# Patient Record
Sex: FEMALE | Race: WHITE | Employment: UNEMPLOYED | URBAN - METROPOLITAN AREA
[De-identification: names, ages, dates, MRNs, and addresses within clinical notes are randomized per-mention and may not be internally consistent; named-entity substitution may affect disease eponyms.]

---

## 2017-03-29 ENCOUNTER — PRE VISIT (OUTPATIENT)
Dept: CARDIOLOGY | Facility: CLINIC | Age: 62
End: 2017-03-29

## 2017-03-29 NOTE — TELEPHONE ENCOUNTER
PMD faxed for records update. Left message for patient asking that she update her VALENTINE with [facility] so care everywhere can be utilized at her visit.

## 2017-04-12 ENCOUNTER — OFFICE VISIT (OUTPATIENT)
Dept: CARDIOLOGY | Facility: CLINIC | Age: 62
End: 2017-04-12
Attending: INTERNAL MEDICINE
Payer: MEDICARE

## 2017-04-12 VITALS
SYSTOLIC BLOOD PRESSURE: 124 MMHG | WEIGHT: 113.2 LBS | BODY MASS INDEX: 26.2 KG/M2 | DIASTOLIC BLOOD PRESSURE: 62 MMHG | HEART RATE: 92 BPM | HEIGHT: 55 IN

## 2017-04-12 DIAGNOSIS — I25.10 CORONARY ARTERY DISEASE INVOLVING NATIVE CORONARY ARTERY OF NATIVE HEART WITHOUT ANGINA PECTORIS: ICD-10-CM

## 2017-04-12 PROCEDURE — 99213 OFFICE O/P EST LOW 20 MIN: CPT | Performed by: INTERNAL MEDICINE

## 2017-04-12 RX ORDER — OMEPRAZOLE 40 MG/1
CAPSULE, DELAYED RELEASE ORAL 2 TIMES DAILY
COMMUNITY

## 2017-04-12 NOTE — LETTER
4/12/2017    Jordon Damico MD  [address] [address]    RE: Kenzie Olson       Dear Colleague,    I had the pleasure of seeing Kenzie Olson in the [facility].    Ms. Kenzie Olson was seen in followup at [facility] in [place].  At 61 years of age, she has end-stage renal disease and is followed for her history of coronary artery disease.      She denies any chest, neck, arm or back discomfort.  She further denies any palpitations, having undergone an SVT ablation in the past.      In 2013, she experienced a non-ST elevation myocardial infarct in the hospital after undergoing pelvic surgery.  She underwent coronary angiography and intervention on the distal right coronary artery with stent placement.  At the time of that angiogram, the left main coronary artery was free of disease.  There was a mid LAD lesion of 60%-70% and the circumflex coronary artery disease, had no obstructive disease.      She had previously undergone renal transplantation, but developed progressive renal failure and has reinitiated on hemodialysis.  She is on dialysis 3 times weekly.  She expresses a sense of fatigue but no shortness of breath or anginal symptoms as noted above.        Her last stress nuclear study was performed during 06/2015 as a preoperative evaluation for sinus surgery.  There was no evidence of ischemia and there was a normal ejection fraction estimated.      She notes that she is following with renal transplant at [facility].  She has had difficulties with anemia and now her white blood cell count is low.  It has been recommended that she see Hematology, but an appointment has not been arranged.      She is on aspirin and beta blockade, but she is not on statin therapy.  She is not on an ARB agent or an ACE inhibitor and I suspect that predates her initiation on hemodialysis.        PHYSICAL EXAMINATION:   GENERAL:  This is a woman in no apparent  distress.  She appears fit and healthy.   VITAL SIGNS:  Blood pressure was 124/62 mmHg, heart rate 92 beats per minute and regular, respiratory rate 14-18 per minute.   CHEST:  Clear to auscultation.   CARDIAC:  On cardiac auscultation, there was an S1 and S2 without extra sounds or murmur.  The rhythm was regular.     Outpatient Encounter Prescriptions as of 4/12/2017   Medication Sig Dispense Refill     omeprazole (PRILOSEC) 40 MG capsule Take by mouth 2 times daily       AMLODIPINE BESYLATE PO Take 5 mg by mouth every evening       SODIUM BICARBONATE PO Take 650 mg by mouth 3 times daily        calcium carbonate (TUMS) 500 MG chewable tablet Take 2 chew tab by mouth 2 times daily       metoprolol (TOPROL-XL) 50 MG 24 hr tablet Take 1 tablet (50 mg) by mouth daily 90 tablet 3     folic acid (FOLVITE) 1 MG tablet Take 1 tablet (1 mg) by mouth daily 30 tablet 2     Cholecalciferol (VITAMIN D3 PO) Take 1,000 Units by mouth 2 times daily        aspirin 81 MG chewable tablet Take 1 tablet (81 mg) by mouth daily 30 tablet      cycloSPORINE modified (NEORAL) 25 MG capsule Take 2 capsules (50 mg) by mouth 2 times daily       sulfamethoxazole-trimethoprim (BACTRIM,SEPTRA) 400-80 MG per tablet Take 1 tablet by mouth daily        mycophenolate (CELLCEPT-GENERIC EQUIVALENT) 500 MG tablet Take 500 mg by mouth 2 times daily       [DISCONTINUED] pantoprazole (PROTONIX) 40 MG EC tablet Take 1 tablet (40 mg) by mouth 2 times daily Take 30-60 minutes before a meal. 30 tablet 1     [DISCONTINUED] ferrous sulfate (IRON) 325 (65 FE) MG tablet Take 1 tablet (325 mg) by mouth 2 times daily (with meals) . Best if taken with orange juice 60 tablet 2     Facility-Administered Encounter Medications as of 4/12/2017   Medication Dose Route Frequency Provider Last Rate Last Dose     sodium chloride (PF) 0.9% PF flush 10 mL  10 mL Intravenous Q10 Min PRN Rob Isabel MD   10 mL at 07/17/14 1111     sodium chloride bacteriostatic 0.9  % flush 0-1 mL  0-1 mL Intradermal Once PRN Rob Isabel MD         sodium chloride (PF) 0.9% PF flush 5-10 mL  5-10 mL Intravenous q1 min prn Rob Isabel MD          ASSESSMENT:  Ms. Olson is overall doing well.  She remains asymptomatic and on risk factor intervention.  Her risk factor intervention could probably be optimized with a statin drug added to her medical regimen,  particularly given the fact that she has renal disease and is on dialysis.  Eventually, I would anticipate that an ARB agent or ACE inhibitor could be reinitiated.      I have arranged for followup with Ms. Olson on an annual basis unless she has earlier symptoms, at which time I would want to see her back sooner.      With regard to hematology, we suggested [facility], which I believe was the recommendation.  We gave her the number and recommended making an appointment for further evaluation.     Again, thank you for allowing me to participate in the care of your patient.      Sincerely,    Yenny Chapa MD     [facility]

## 2017-04-12 NOTE — MR AVS SNAPSHOT
"              After Visit Summary   4/12/2017    Kenzie Olson    MRN: [id number]           Patient Information     Date Of Birth          1955        Visit Information        Provider Department      4/12/2017 1:15 PM Yenny Chapa MD [facility]        Today's Diagnoses     Coronary artery disease involving native coronary artery of native heart without angina pectoris           Follow-ups after your visit        Additional Services     Follow-Up with Cardiologist                 Future tests that were ordered for you today     Open Future Orders        Priority Expected Expires Ordered    Follow-Up with Cardiologist Routine 4/12/2018 8/25/2018 4/12/2017            Who to contact     If you have questions or need follow up information about today's clinic visit or your schedule please contact [facility] directly at [phone].  Normal or non-critical lab and imaging results will be communicated to you by [employer], letter or phone within 4 business days after the clinic has received the results. If you do not hear from us within 7 days, please contact the clinic through [employer] or phone. If you have a critical or abnormal lab result, we will notify you by phone as soon as possible.  Submit refill requests through [employer] or call your pharmacy and they will forward the refill request to us. Please allow 3 business days for your refill to be completed.          Additional Information About Your Visit        MyChart Information     [employer] lets you send messages to your doctor, view your test results, renew your prescriptions, schedule appointments and more. To sign up, go to [email or web address] . Click on \"Log in\" on the left side of the screen, which will take you to the Welcome page. Then click on \"Sign up Now\" on the right side of the page.     You will be asked to enter the access code listed below, as well as some " "personal information. Please follow the directions to create your username and password.     Your access code is: [email or web address]  Expires: 2017  1:58 PM     Your access code will  in 90 days. If you need help or a new code, please call your [facility] clinic or [phone].        Care EveryWhere ID     This is your Care EveryWhere ID. This could be used by other organizations to access your [facility] medical records  [id number]        Your Vitals Were     Pulse Height BMI (Body Mass Index)             92 1.397 m (4' 7\") 26.31 kg/m2          Blood Pressure from Last 3 Encounters:   17 124/62   16 149/68   10/13/16 122/68    Weight from Last 3 Encounters:   17 51.3 kg (113 lb 3.2 oz)   16 51.7 kg (114 lb)   10/13/16 52.1 kg (114 lb 14.4 oz)              We Performed the Following     EKG 12-lead complete w/read - Clinics (performed today)     Follow-Up with Cardiologist          Today's Medication Changes          These changes are accurate as of: 17  1:59 PM.  If you have any questions, ask your nurse or doctor.               Stop taking these medicines if you haven't already. Please contact your care team if you have questions.     ferrous sulfate 325 (65 FE) MG tablet   Commonly known as:  IRON   Stopped by:  Yenny Chapa MD           pantoprazole 40 MG EC tablet   Commonly known as:  PROTONIX   Stopped by:  Yenny Chapa MD                    Primary Care Provider Office Phone # Fax #    Jordon Damico [phone]       [address] [address]        Thank you!     Thank you for choosing [facility] [place]  for your care. Our goal is always to provide you with excellent care. Hearing back from our patients is one way we can continue to improve our services. Please take a few minutes to complete the written survey that you may receive in the mail after your visit with us. Thank " you!             Your Updated Medication List - Protect others around you: Learn how to safely use, store and throw away your medicines at www.disposemymeds.org.          This list is accurate as of: 4/12/17  1:59 PM.  Always use your most recent med list.                   Brand Name Dispense Instructions for use    AMLODIPINE BESYLATE PO      Take 5 mg by mouth every evening       aspirin 81 MG chewable tablet     30 tablet    Take 1 tablet (81 mg) by mouth daily       calcium carbonate 500 MG chewable tablet    TUMS     Take 2 chew tab by mouth 2 times daily       cycloSPORINE modified capsule      Take 2 capsules (50 mg) by mouth 2 times daily       folic acid 1 MG tablet    FOLVITE    30 tablet    Take 1 tablet (1 mg) by mouth daily       metoprolol 50 MG 24 hr tablet    TOPROL-XL    90 tablet    Take 1 tablet (50 mg) by mouth daily       mycophenolate 500 MG tablet    CELLCEPT - GENERIC EQUIVALENT     Take 500 mg by mouth 2 times daily       omeprazole 40 MG capsule    priLOSEC     Take by mouth 2 times daily       SODIUM BICARBONATE PO      Take 650 mg by mouth 3 times daily       sulfamethoxazole-trimethoprim 400-80 MG per tablet    BACTRIM/SEPTRA     Take 1 tablet by mouth daily       VITAMIN D3 PO      Take 1,000 Units by mouth 2 times daily

## 2017-04-13 NOTE — PROGRESS NOTES
HPI and Plan:   See dictation    Orders Placed This Encounter   Procedures     Follow-Up with Cardiologist     EKG 12-lead complete w/read - Clinics (performed today)       Orders Placed This Encounter   Medications     omeprazole (PRILOSEC) 40 MG capsule     Sig: Take by mouth 2 times daily       Medications Discontinued During This Encounter   Medication Reason     ferrous sulfate (IRON) 325 (65 FE) MG tablet      pantoprazole (PROTONIX) 40 MG EC tablet          Encounter Diagnosis   Name Primary?     Coronary artery disease involving native coronary artery of native heart without angina pectoris        CURRENT MEDICATIONS:  Current Outpatient Prescriptions   Medication Sig Dispense Refill     omeprazole (PRILOSEC) 40 MG capsule Take by mouth 2 times daily       AMLODIPINE BESYLATE PO Take 5 mg by mouth every evening       SODIUM BICARBONATE PO Take 650 mg by mouth 3 times daily        calcium carbonate (TUMS) 500 MG chewable tablet Take 2 chew tab by mouth 2 times daily       metoprolol (TOPROL-XL) 50 MG 24 hr tablet Take 1 tablet (50 mg) by mouth daily 90 tablet 3     folic acid (FOLVITE) 1 MG tablet Take 1 tablet (1 mg) by mouth daily 30 tablet 2     Cholecalciferol (VITAMIN D3 PO) Take 1,000 Units by mouth 2 times daily        aspirin 81 MG chewable tablet Take 1 tablet (81 mg) by mouth daily 30 tablet      cycloSPORINE modified (NEORAL) 25 MG capsule Take 2 capsules (50 mg) by mouth 2 times daily       sulfamethoxazole-trimethoprim (BACTRIM,SEPTRA) 400-80 MG per tablet Take 1 tablet by mouth daily        mycophenolate (CELLCEPT-GENERIC EQUIVALENT) 500 MG tablet Take 500 mg by mouth 2 times daily         ALLERGIES     Allergies   Allergen Reactions     Morphine Sulfate Other (See Comments)     hallucinations       PAST MEDICAL HISTORY:  Past Medical History:   Diagnosis Date     Arthritis      Breast cancer (H)     lumpectomy and radiation right side  2006     Chronic kidney disease     end stage     Coronary  artery disease     cath 9/20/13: KAPIL to RCA     Coughing      Difficulty in walking(719.7)      Gastro-oesophageal reflux disease      Hypertension      Myocardial infarction (H) 2013    NSTEMI     Paroxysmal supraventricular tachycardia (H)     7/2013 Ablation of AV node for reentrant tach     PONV (postoperative nausea and vomiting)      Renal disease     hx of kidney transplant 1996, Dr. Stevenson     Stented coronary artery      Vitamin D deficient rickets      Walking troubles        PAST SURGICAL HISTORY:  Past Surgical History:   Procedure Laterality Date     bow leg correction       COLONOSCOPY N/A 12/1/2016    Procedure: COLONOSCOPY;  Surgeon: Jordon Alexis MD;  Location:  GI     ESOPHAGOSCOPY, GASTROSCOPY, DUODENOSCOPY (EGD), COMBINED N/A 12/1/2016    Procedure: COMBINED ESOPHAGOSCOPY, GASTROSCOPY, DUODENOSCOPY (EGD), BIOPSY SINGLE OR MULTIPLE;  Surgeon: Jordon Alexis MD;  Location:  GI     H ABLATION AV NODE  7-11-13    SVT ablation      HEART CATH, ANGIOPLASTY  9-20-13    severe right coronary artery stenosis of 95% in the distal vessel.  KAPIL      LAPAROTOMY EXPLORATORY  9/6/2013    Procedure: LAPAROTOMY EXPLORATORY;  Bilateral salpingo-oophorectomy, anastomsis of renal vein by Dr. Abbasi;  Surgeon: Tommy Foote MD;  Location:  OR     LUMPECTOMY BREAST       OPTICAL TRACKING SYSTEM ENDOSCOPIC SINUS SURGERY N/A 7/22/2015    Procedure: OPTICAL TRACKING SYSTEM ENDOSCOPIC SINUS SURGERY;  Surgeon: María Diamond MD;  Location: [facility] OR     ORTHOPEDIC SURGERY      left shoulder surgery,  torn meniscus left knee     PARATHYROIDECTOMY       PARATHYROIDECTOMY       SALPINGO-OOPHORECTOMY BILATERAL  9/6/2013    Procedure: SALPINGO-OOPHORECTOMY BILATERAL;;  Surgeon: Tommy Foote MD;  Location:  OR     TRANSPLANT KIDNEY AUTOLOGOUS         FAMILY HISTORY:  Family History   Problem Relation Age of Onset     CEREBROVASCULAR DISEASE Mother 69     Hypertension Mother      CEREBROVASCULAR DISEASE  "Father 82     Hypertension Father      HEART DISEASE Brother        SOCIAL HISTORY:  Social History     Social History     Marital status: Single     Spouse name: N/A     Number of children: N/A     Years of education: N/A     Social History Main Topics     Smoking status: Never Smoker     Smokeless tobacco: Never Used     Alcohol use No     Drug use: No     Sexual activity: No     Other Topics Concern     Parent/Sibling W/ Cabg, Mi Or Angioplasty Before 65f 55m? No     Caffeine Concern Yes     3 cups a week     Special Diet Yes     Exercise No     Social History Narrative    Enjoys reading.       Review of Systems:  Skin:  Negative       Eyes:  Positive for glasses    ENT:  Positive for hearing loss  (? esophegael stricture, food gets caught)  Respiratory:  Positive for cough;dyspnea on exertion during dialysis   Cardiovascular:    Positive for;dizziness;lightheadedness;fatigue;palpitations    Gastroenterology: Positive for poor appetite for days after dialysis appetite not good   Genitourinary:  Positive for nocturia q 2 hours   Musculoskeletal:  Positive for joint pain    Neurologic:  Positive for headaches    Psychiatric:  Positive for sleep disturbances    Heme/Lymph/Imm:  Positive for allergies    Endocrine:  Positive for thyroid disorder      Physical Exam:  Vitals: /62  Pulse 92  Ht 1.397 m (4' 7\")  Wt 51.3 kg (113 lb 3.2 oz)  BMI 26.31 kg/m2    Constitutional:  cooperative, alert and oriented, well developed, well nourished, in no acute distress        Skin:  warm and dry to the touch        Head:  normocephalic        Eyes:  sclera white        ENT:           Neck:           Chest:  normal breath sounds, clear to auscultation, normal A-P diameter, normal symmetry, normal respiratory excursion, no use of accessory muscles          Cardiac: regular rhythm, normal S1/S2, no S3 or S4, apical impulse not displaced, no murmurs, gallops or rubs                  Abdomen:           Vascular:               "                            Extremities and Back:  no edema;no deformities, clubbing, cyanosis, erythema observed              Neurological:  no gross motor deficits;affect appropriate, oriented to time, person and place              CC  Yenny Chapa MD   [facility]  [address]  [place] [address]

## 2017-04-19 ENCOUNTER — TRANSFERRED RECORDS (OUTPATIENT)
Dept: HEALTH INFORMATION MANAGEMENT | Facility: CLINIC | Age: 62
End: 2017-04-19

## 2017-04-26 ENCOUNTER — HOSPITAL ENCOUNTER (OUTPATIENT)
Facility: CLINIC | Age: 62
Discharge: HOME | End: 2017-04-26
Attending: PATHOLOGY | Admitting: INTERNAL MEDICINE
Payer: MEDICARE

## 2017-04-26 VITALS
RESPIRATION RATE: 16 BRPM | BODY MASS INDEX: 26.38 KG/M2 | OXYGEN SATURATION: 98 % | HEIGHT: 55 IN | DIASTOLIC BLOOD PRESSURE: 74 MMHG | WEIGHT: 114 LBS | TEMPERATURE: 97.2 F | SYSTOLIC BLOOD PRESSURE: 154 MMHG

## 2017-04-26 LAB
ABO + RH BLD: NORMAL
ABO + RH BLD: NORMAL
BASOPHILS # BLD AUTO: 0 10E9/L (ref 0–0.2)
BASOPHILS NFR BLD AUTO: 0.3 %
BLD GP AB SCN SERPL QL: NORMAL
BLD PROD TYP BPU: NORMAL
BLD UNIT ID BPU: 0
BLD UNIT ID BPU: 0
BLOOD BANK CMNT PATIENT-IMP: NORMAL
BLOOD PRODUCT CODE: NORMAL
BLOOD PRODUCT CODE: NORMAL
BPU ID: NORMAL
BPU ID: NORMAL
DIFFERENTIAL METHOD BLD: ABNORMAL
EOSINOPHIL # BLD AUTO: 0.1 10E9/L (ref 0–0.7)
EOSINOPHIL NFR BLD AUTO: 3.3 %
ERYTHROCYTE [DISTWIDTH] IN BLOOD BY AUTOMATED COUNT: 14.8 % (ref 10–15)
HCT VFR BLD AUTO: 20.7 % (ref 35–47)
HGB BLD-MCNC: 6.4 G/DL (ref 11.7–15.7)
HGB BLD-MCNC: 7 G/DL (ref 11.7–15.7)
IMM GRANULOCYTES # BLD: 0 10E9/L (ref 0–0.4)
IMM GRANULOCYTES NFR BLD: 0 %
LYMPHOCYTES # BLD AUTO: 0.3 10E9/L (ref 0.8–5.3)
LYMPHOCYTES NFR BLD AUTO: 8.7 %
MCH RBC QN AUTO: 30.5 PG (ref 26.5–33)
MCHC RBC AUTO-ENTMCNC: 30.9 G/DL (ref 31.5–36.5)
MCV RBC AUTO: 99 FL (ref 78–100)
MONOCYTES # BLD AUTO: 0.3 10E9/L (ref 0–1.3)
MONOCYTES NFR BLD AUTO: 8.7 %
NEUTROPHILS # BLD AUTO: 2.6 10E9/L (ref 1.6–8.3)
NEUTROPHILS NFR BLD AUTO: 79 %
NRBC # BLD AUTO: 0 10*3/UL
NRBC BLD AUTO-RTO: 0 /100
NUM BPU REQUESTED: 2
PLATELET # BLD AUTO: 257 10E9/L (ref 150–450)
RBC # BLD AUTO: 2.1 10E12/L (ref 3.8–5.2)
RETICS # AUTO: 49.3 10E9/L (ref 25–95)
RETICS/RBC NFR AUTO: 2.4 % (ref 0.5–2)
SPECIMEN EXP DATE BLD: NORMAL
TRANSFUSION STATUS PATIENT QL: NORMAL
WBC # BLD AUTO: 3.3 10E9/L (ref 4–11)

## 2017-04-26 PROCEDURE — 36430 TRANSFUSION BLD/BLD COMPNT: CPT

## 2017-04-26 PROCEDURE — 88305 TISSUE EXAM BY PATHOLOGIST: CPT | Performed by: INTERNAL MEDICINE

## 2017-04-26 PROCEDURE — 88182 CELL MARKER STUDY: CPT | Performed by: INTERNAL MEDICINE

## 2017-04-26 PROCEDURE — P9016 RBC LEUKOCYTES REDUCED: HCPCS | Performed by: INTERNAL MEDICINE

## 2017-04-26 PROCEDURE — 40000948 ZZHCL STATISTIC BONE MARROW ASP TC 85097: Performed by: INTERNAL MEDICINE

## 2017-04-26 PROCEDURE — 88237 TISSUE CULTURE BONE MARROW: CPT | Performed by: PATHOLOGY

## 2017-04-26 PROCEDURE — 88305 TISSUE EXAM BY PATHOLOGIST: CPT | Mod: 26 | Performed by: INTERNAL MEDICINE

## 2017-04-26 PROCEDURE — 85097 BONE MARROW INTERPRETATION: CPT | Performed by: INTERNAL MEDICINE

## 2017-04-26 PROCEDURE — 00000159 ZZHCL STATISTIC H-SEND OUTS PREP: Performed by: INTERNAL MEDICINE

## 2017-04-26 PROCEDURE — 36415 COLL VENOUS BLD VENIPUNCTURE: CPT | Performed by: INTERNAL MEDICINE

## 2017-04-26 PROCEDURE — 40000424 ZZHCL STATISTIC BONE MARROW CORE PERF TC 38221: Performed by: INTERNAL MEDICINE

## 2017-04-26 PROCEDURE — 36415 COLL VENOUS BLD VENIPUNCTURE: CPT | Performed by: PATHOLOGY

## 2017-04-26 PROCEDURE — 88311 DECALCIFY TISSUE: CPT | Mod: 26 | Performed by: INTERNAL MEDICINE

## 2017-04-26 PROCEDURE — 88313 SPECIAL STAINS GROUP 2: CPT | Performed by: INTERNAL MEDICINE

## 2017-04-26 PROCEDURE — 88184 FLOWCYTOMETRY/ TC 1 MARKER: CPT | Performed by: PATHOLOGY

## 2017-04-26 PROCEDURE — 88185 FLOWCYTOMETRY/TC ADD-ON: CPT | Performed by: PATHOLOGY

## 2017-04-26 PROCEDURE — 85025 COMPLETE CBC W/AUTO DIFF WBC: CPT | Performed by: PATHOLOGY

## 2017-04-26 PROCEDURE — 00000058 ZZHCL STATISTIC BONE MARROW ASP PERF TC 38220: Performed by: INTERNAL MEDICINE

## 2017-04-26 PROCEDURE — 25000125 ZZHC RX 250: Performed by: PATHOLOGY

## 2017-04-26 PROCEDURE — 88264 CHROMOSOME ANALYSIS 20-25: CPT | Performed by: PATHOLOGY

## 2017-04-26 PROCEDURE — 40001005 ZZHCL STATISTIC FLOW >15 ABY TC 88189: Performed by: PATHOLOGY

## 2017-04-26 PROCEDURE — 40000795 ZZHCL STATISTIC DNA PROCESS AND HOLD: Performed by: INTERNAL MEDICINE

## 2017-04-26 PROCEDURE — 88313 SPECIAL STAINS GROUP 2: CPT | Mod: 26 | Performed by: INTERNAL MEDICINE

## 2017-04-26 PROCEDURE — 38221 DX BONE MARROW BIOPSIES: CPT | Performed by: PATHOLOGY

## 2017-04-26 PROCEDURE — 86850 RBC ANTIBODY SCREEN: CPT | Performed by: INTERNAL MEDICINE

## 2017-04-26 PROCEDURE — 88311 DECALCIFY TISSUE: CPT | Performed by: INTERNAL MEDICINE

## 2017-04-26 PROCEDURE — 85045 AUTOMATED RETICULOCYTE COUNT: CPT | Performed by: PATHOLOGY

## 2017-04-26 PROCEDURE — 85018 HEMOGLOBIN: CPT | Performed by: INTERNAL MEDICINE

## 2017-04-26 PROCEDURE — 86901 BLOOD TYPING SEROLOGIC RH(D): CPT | Performed by: INTERNAL MEDICINE

## 2017-04-26 PROCEDURE — G0364 BONE MARROW ASPIRATE &BIOPSY: HCPCS | Performed by: INTERNAL MEDICINE

## 2017-04-26 PROCEDURE — 86900 BLOOD TYPING SEROLOGIC ABO: CPT | Performed by: INTERNAL MEDICINE

## 2017-04-26 PROCEDURE — 38221 DX BONE MARROW BIOPSIES: CPT | Performed by: INTERNAL MEDICINE

## 2017-04-26 PROCEDURE — 85060 BLOOD SMEAR INTERPRETATION: CPT | Performed by: INTERNAL MEDICINE

## 2017-04-26 PROCEDURE — 40000847 ZZHCL STATISTIC MORPHOLOGY W/INTERP HISTOLOGY TC 85060: Performed by: INTERNAL MEDICINE

## 2017-04-26 PROCEDURE — 86923 COMPATIBILITY TEST ELECTRIC: CPT | Performed by: INTERNAL MEDICINE

## 2017-04-26 PROCEDURE — 88280 CHROMOSOME KARYOTYPE STUDY: CPT | Performed by: PATHOLOGY

## 2017-04-26 RX ORDER — FLUMAZENIL 0.1 MG/ML
0.2 INJECTION, SOLUTION INTRAVENOUS
Status: DISCONTINUED | OUTPATIENT
Start: 2017-04-26 | End: 2017-04-26 | Stop reason: HOSPADM

## 2017-04-26 RX ORDER — FLUMAZENIL 0.1 MG/ML
0.2 INJECTION, SOLUTION INTRAVENOUS
Status: DISCONTINUED | OUTPATIENT
Start: 2017-04-26 | End: 2017-04-26

## 2017-04-26 RX ORDER — NALOXONE HYDROCHLORIDE 0.4 MG/ML
.1-.4 INJECTION, SOLUTION INTRAMUSCULAR; INTRAVENOUS; SUBCUTANEOUS
Status: DISCONTINUED | OUTPATIENT
Start: 2017-04-26 | End: 2017-04-26

## 2017-04-26 RX ORDER — FENTANYL CITRATE 50 UG/ML
50 INJECTION, SOLUTION INTRAMUSCULAR; INTRAVENOUS
Status: DISCONTINUED | OUTPATIENT
Start: 2017-04-26 | End: 2017-04-26

## 2017-04-26 RX ORDER — LIDOCAINE HYDROCHLORIDE AND EPINEPHRINE 10; 10 MG/ML; UG/ML
INJECTION, SOLUTION INFILTRATION; PERINEURAL PRN
Status: DISCONTINUED | OUTPATIENT
Start: 2017-04-26 | End: 2017-04-26 | Stop reason: HOSPADM

## 2017-04-26 RX ORDER — FENTANYL CITRATE 50 UG/ML
25-50 INJECTION, SOLUTION INTRAMUSCULAR; INTRAVENOUS EVERY 5 MIN PRN
Status: DISCONTINUED | OUTPATIENT
Start: 2017-04-26 | End: 2017-04-26

## 2017-04-26 RX ORDER — FENTANYL CITRATE 50 UG/ML
25-50 INJECTION, SOLUTION INTRAMUSCULAR; INTRAVENOUS EVERY 5 MIN PRN
Status: DISCONTINUED | OUTPATIENT
Start: 2017-04-26 | End: 2017-04-26 | Stop reason: HOSPADM

## 2017-04-26 RX ORDER — ONDANSETRON 2 MG/ML
4 INJECTION INTRAMUSCULAR; INTRAVENOUS
Status: DISCONTINUED | OUTPATIENT
Start: 2017-04-26 | End: 2017-04-26 | Stop reason: HOSPADM

## 2017-04-26 RX ORDER — NALOXONE HYDROCHLORIDE 0.4 MG/ML
.1-.4 INJECTION, SOLUTION INTRAMUSCULAR; INTRAVENOUS; SUBCUTANEOUS
Status: DISCONTINUED | OUTPATIENT
Start: 2017-04-26 | End: 2017-04-26 | Stop reason: HOSPADM

## 2017-04-26 RX ORDER — LIDOCAINE HYDROCHLORIDE 10 MG/ML
8-10 INJECTION, SOLUTION INFILTRATION; PERINEURAL
Status: DISCONTINUED | OUTPATIENT
Start: 2017-04-26 | End: 2017-04-26 | Stop reason: HOSPADM

## 2017-04-26 RX ORDER — FENTANYL CITRATE 50 UG/ML
50 INJECTION, SOLUTION INTRAMUSCULAR; INTRAVENOUS
Status: DISCONTINUED | OUTPATIENT
Start: 2017-04-26 | End: 2017-04-26 | Stop reason: HOSPADM

## 2017-04-26 RX ORDER — ONDANSETRON 2 MG/ML
4 INJECTION INTRAMUSCULAR; INTRAVENOUS
Status: DISCONTINUED | OUTPATIENT
Start: 2017-04-26 | End: 2017-04-26

## 2017-04-26 NOTE — PROCEDURES
The patient was positively identified and informed consent was obtained (see the completed Affirmation of Consent for Bone Marrow Aspiration and/or Biopsy Procedure(s) form in the patient's chart). The patient was placed in the prone position and the bony landmarks of the pelvis were identified. Medical staff reconfirmed the patient's name, date of birth and procedure. The skin over the posterior iliac crest was scrubbed and draped in a sterile fashion. The local area of the procedure was anesthetized with a total of 13 mL of 1% Lidocaine and a small incision was made.  The patient did not receive conscious sedation.    Trephine bone marrow core(s) was/were obtained from the left posterior iliac crest. Bone marrow aspirate was obtained from the left posterior iliac crest for: morphology with possible immunophenotyping and/or cytogenetics and molecular diagnostics    Direct pressure was applied to the biopsy site with sterile gauze. The biopsy site was cleaned with alcohol and a sterile dressing was placed over the biopsy incision using a pressure bandage. The patient was then placed in the supine position to maintain pressure on the biopsy site. Post-procedure wound care instructions, including routine dressing instructions and analgesia, were given to the patient. The procedure was completed without complication.

## 2017-04-26 NOTE — IP AVS SNAPSHOT
[facility]    [address] [place] [id number]    Phone:  [phone]                                       After Visit Summary   4/26/2017    Kenzie Olson    MRN: [id number]           After Visit Summary Signature Page     I have received my discharge instructions, and my questions have been answered. I have discussed any challenges I see with this plan with the nurse or doctor.    ..........................................................................................................................................  Patient/Patient Representative Signature      ..........................................................................................................................................  Patient Representative Print Name and Relationship to Patient    ..................................................               ................................................  Date                                            Time    ..........................................................................................................................................  Reviewed by Signature/Title    ...................................................              ..............................................  Date                                                            Time

## 2017-04-26 NOTE — IP AVS SNAPSHOT
"                  MRN:[id number]                      After Visit Summary   4/26/2017    Kenzie Olson    MRN: [id number]           Thank you!     Thank you for choosing [facility] for your care. Our goal is always to provide you with excellent care. Hearing back from our patients is one way we can continue to improve our services. Please take a few minutes to complete the written survey that you may receive in the mail after you visit with us. Thank you!        Patient Information     Date Of Birth          1955        About your hospital stay     You were admitted on:  April 26, 2017 You last received care in the:  [facility] Observation Unit    You were discharged on:  April 26, 2017       Who to Call     For medical emergencies, please call 911.  For non-urgent questions about your medical care, please call your primary care provider or clinic, [phone]  For questions related to your surgery, please call your surgery clinic        Attending Provider     Provider Specialty    Chica Mathis MD Pathology       Primary Care Provider Office Phone # Fax #    Jordon GERSON Damico [phone]       [place] [address]        Pending Results     Date and Time Order Name Status Description    4/26/2017 1223 Bone marrow biopsy In process     4/26/2017 1218 Bone marrow biopsy In process     4/26/2017 1027 Surgical pathology exam In process     4/26/2017 1019 Leukemia Lymphoma Evaluation (Flow Cytometry) In process     4/26/2017 1019 Chromosome bone marrow In process             Admission Information     Date & Time Provider Department Dept. Phone    4/26/2017 Chica Mathis MD [facility] [phone]      Your Vitals Were     Blood Pressure Temperature Respirations Height Weight Pulse Oximetry    154/74 97.2  F (36.2  C) (Oral) 16 1.397 m (4' 7\") 51.7 kg (114 lb) 98%    BMI (Body Mass Index)                   26.5 kg/m2         " "  MyChart Information     vidIQ lets you send messages to your doctor, view your test results, renew your prescriptions, schedule appointments and more. To sign up, go to [email or web address] . Click on \"Log in\" on the left side of the screen, which will take you to the Welcome page. Then click on \"Sign up Now\" on the right side of the page.     You will be asked to enter the access code listed below, as well as some personal information. Please follow the directions to create your username and password.     Your access code is: [email or web address]  Expires: 2017  1:58 PM     Your access code will  in 90 days. If you need help or a new code, please call your [facility] clinic or [phone].        Care EveryWhere ID     This is your Care EveryWhere ID. This could be used by other organizations to access your [facility] medical records  [id number]           Review of your medicines      UNREVIEWED medicines. Ask your doctor about these medicines        Dose / Directions    AMLODIPINE BESYLATE PO        Dose:  5 mg   Take 5 mg by mouth every evening   Refills:  0       aspirin 81 MG chewable tablet        Dose:  81 mg   Take 1 tablet (81 mg) by mouth daily   Quantity:  30 tablet   Refills:  0       calcium carbonate 500 MG chewable tablet   Commonly known as:  TUMS        Dose:  2 chew tab   Take 2 chew tab by mouth 2 times daily   Refills:  0       cycloSPORINE modified capsule   Used for:  History of renal transplant        Dose:  50 mg   Take 2 capsules (50 mg) by mouth 2 times daily   Refills:  0       folic acid 1 MG tablet   Commonly known as:  FOLVITE   Used for:  Anemia in chronic kidney disease (CKD)        Dose:  1 mg   Take 1 tablet (1 mg) by mouth daily   Quantity:  30 tablet   Refills:  2       metoprolol 50 MG 24 hr tablet   Commonly known as:  TOPROL-XL   Used for:  CAD (coronary artery disease)        Dose:  50 mg   Take 1 tablet (50 mg) by mouth daily   Quantity:  90 tablet   Refills:  3    "    mycophenolate 500 MG tablet   Commonly known as:  CELLCEPT - GENERIC EQUIVALENT        Dose:  500 mg   Take 500 mg by mouth 2 times daily   Refills:  0       omeprazole 40 MG capsule   Commonly known as:  priLOSEC        Take by mouth 2 times daily   Refills:  0       SODIUM BICARBONATE PO        Dose:  650 mg   Take 650 mg by mouth 3 times daily   Refills:  0       sulfamethoxazole-trimethoprim 400-80 MG per tablet   Commonly known as:  BACTRIM/SEPTRA   Indication:  PCP prophylaxis in transplant patient        Dose:  1 tablet   Take 1 tablet by mouth daily   Refills:  0       VITAMIN D3 PO        Dose:  1000 Units   Take 1,000 Units by mouth 2 times daily   Refills:  0                Protect others around you: Learn how to safely use, store and throw away your medicines at www.disposemymeds.org.             Medication List: This is a list of all your medications and when to take them. Check marks below indicate your daily home schedule. Keep this list as a reference.      Medications           Morning Afternoon Evening Bedtime As Needed    AMLODIPINE BESYLATE PO   Take 5 mg by mouth every evening                                aspirin 81 MG chewable tablet   Take 1 tablet (81 mg) by mouth daily                                calcium carbonate 500 MG chewable tablet   Commonly known as:  TUMS   Take 2 chew tab by mouth 2 times daily                                cycloSPORINE modified capsule   Take 2 capsules (50 mg) by mouth 2 times daily                                folic acid 1 MG tablet   Commonly known as:  FOLVITE   Take 1 tablet (1 mg) by mouth daily                                metoprolol 50 MG 24 hr tablet   Commonly known as:  TOPROL-XL   Take 1 tablet (50 mg) by mouth daily                                mycophenolate 500 MG tablet   Commonly known as:  CELLCEPT - GENERIC EQUIVALENT   Take 500 mg by mouth 2 times daily                                omeprazole 40 MG capsule   Commonly known as:   priLOSEC   Take by mouth 2 times daily                                SODIUM BICARBONATE PO   Take 650 mg by mouth 3 times daily                                sulfamethoxazole-trimethoprim 400-80 MG per tablet   Commonly known as:  BACTRIM/SEPTRA   Take 1 tablet by mouth daily                                VITAMIN D3 PO   Take 1,000 Units by mouth 2 times daily

## 2017-04-27 LAB
COPATH REPORT: NORMAL
COPATH REPORT: NORMAL

## 2017-04-27 NOTE — DISCHARGE SUMMARY
Pt denied transfusion reaction. AVS printed, reviewed and given to patient. All questions answered. Discharged off unit with staff member, friend to provide ride home.

## 2017-05-03 ENCOUNTER — TRANSFERRED RECORDS (OUTPATIENT)
Dept: HEALTH INFORMATION MANAGEMENT | Facility: CLINIC | Age: 62
End: 2017-05-03

## 2017-05-05 ENCOUNTER — TRANSFERRED RECORDS (OUTPATIENT)
Dept: HEALTH INFORMATION MANAGEMENT | Facility: CLINIC | Age: 62
End: 2017-05-05

## 2017-05-05 LAB — COPATH REPORT: NORMAL

## 2017-05-08 LAB — COPATH REPORT: NORMAL

## 2017-07-23 ENCOUNTER — TRANSFERRED RECORDS (OUTPATIENT)
Dept: HEALTH INFORMATION MANAGEMENT | Facility: CLINIC | Age: 62
End: 2017-07-23

## 2017-09-25 DIAGNOSIS — I25.10 CAD (CORONARY ARTERY DISEASE): ICD-10-CM

## 2017-09-25 RX ORDER — METOPROLOL SUCCINATE 50 MG/1
50 TABLET, EXTENDED RELEASE ORAL DAILY
Qty: 90 TABLET | Refills: 2 | Status: SHIPPED | OUTPATIENT
Start: 2017-09-25

## 2017-10-02 ENCOUNTER — TRANSFERRED RECORDS (OUTPATIENT)
Dept: HEALTH INFORMATION MANAGEMENT | Facility: CLINIC | Age: 62
End: 2017-10-02

## 2017-10-02 LAB
ANION GAP SERPL CALCULATED.3IONS-SCNC: NORMAL MMOL/L
BUN SERPL-MCNC: 48 MG/DL
CALCIUM SERPL-MCNC: 9.3 MG/DL
CHLORIDE SERPLBLD-SCNC: 103 MMOL/L
CO2 SERPL-SCNC: 22 MMOL/L
CREAT SERPL-MCNC: 5.74 MG/DL
ERYTHROCYTE [DISTWIDTH] IN BLOOD BY AUTOMATED COUNT: 16.2 %
GFR SERPL CREATININE-BSD FRML MDRD: NORMAL ML/MIN/1.73M2
GLUCOSE SERPL-MCNC: NORMAL MG/DL (ref 70–99)
HCT VFR BLD AUTO: 27.9 %
HEMOGLOBIN: 8.2 G/DL (ref 12–16)
MCH RBC QN AUTO: 30.1 PG
MCHC RBC AUTO-ENTMCNC: 29.3 G/DL
MCV RBC AUTO: 102.8 FL
PLATELET # BLD AUTO: 402 10^9/L
POTASSIUM SERPL-SCNC: 3.6 MMOL/L
RBC # BLD AUTO: 2.72 10^12/L
SODIUM SERPL-SCNC: 139 MMOL/L
WBC # BLD AUTO: 5.6 10^9/L

## 2017-10-25 ENCOUNTER — TRANSFERRED RECORDS (OUTPATIENT)
Dept: HEALTH INFORMATION MANAGEMENT | Facility: CLINIC | Age: 62
End: 2017-10-25

## 2017-10-26 ENCOUNTER — TRANSFERRED RECORDS (OUTPATIENT)
Dept: HEALTH INFORMATION MANAGEMENT | Facility: CLINIC | Age: 62
End: 2017-10-26

## 2017-10-30 ENCOUNTER — PRE VISIT (OUTPATIENT)
Dept: CARDIOLOGY | Facility: CLINIC | Age: 62
End: 2017-10-30

## 2017-11-01 NOTE — TELEPHONE ENCOUNTER
Records received from [facility] dialysis, copy sent to scan.     Record request sent to Dr. Lizeth Linares at [facility] with nephrology for update.

## 2017-11-06 ENCOUNTER — TRANSFERRED RECORDS (OUTPATIENT)
Dept: HEALTH INFORMATION MANAGEMENT | Facility: CLINIC | Age: 62
End: 2017-11-06

## 2017-11-06 LAB
ANION GAP SERPL CALCULATED.3IONS-SCNC: NORMAL MMOL/L
BUN SERPL-MCNC: 46 MG/DL
CALCIUM SERPL-MCNC: 8.6 MG/DL
CHLORIDE SERPLBLD-SCNC: 103 MMOL/L
CO2 SERPL-SCNC: 20 MMOL/L
CREAT SERPL-MCNC: 6.47 MG/DL
GFR SERPL CREATININE-BSD FRML MDRD: NORMAL ML/MIN/1.73M2
GLUCOSE SERPL-MCNC: NORMAL MG/DL (ref 70–99)
POTASSIUM SERPL-SCNC: 4.3 MMOL/L
SODIUM SERPL-SCNC: 135 MMOL/L

## 2017-11-08 ENCOUNTER — TRANSFERRED RECORDS (OUTPATIENT)
Dept: HEALTH INFORMATION MANAGEMENT | Facility: CLINIC | Age: 62
End: 2017-11-08
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
## 2017-11-10 NOTE — TELEPHONE ENCOUNTER
Records received from [facility] dialysis, including labs drawn on 11/6. Copy sent to scan.

## 2017-11-15 ENCOUNTER — OFFICE VISIT (OUTPATIENT)
Dept: CARDIOLOGY | Facility: CLINIC | Age: 62
End: 2017-11-15
Attending: INTERNAL MEDICINE
Payer: MEDICARE

## 2017-11-15 VITALS
DIASTOLIC BLOOD PRESSURE: 74 MMHG | HEART RATE: 84 BPM | HEIGHT: 55 IN | WEIGHT: 118.3 LBS | SYSTOLIC BLOOD PRESSURE: 134 MMHG | BODY MASS INDEX: 27.38 KG/M2

## 2017-11-15 DIAGNOSIS — I25.10 CORONARY ARTERY DISEASE INVOLVING NATIVE CORONARY ARTERY OF NATIVE HEART WITHOUT ANGINA PECTORIS: ICD-10-CM

## 2017-11-15 DIAGNOSIS — I47.10 PAROXYSMAL SUPRAVENTRICULAR TACHYCARDIA (H): ICD-10-CM

## 2017-11-15 DIAGNOSIS — I21.3 ST ELEVATION MYOCARDIAL INFARCTION (STEMI), UNSPECIFIED ARTERY (H): Primary | ICD-10-CM

## 2017-11-15 LAB
ALT SERPL W P-5'-P-CCNC: <5 U/L (ref 5–30)
CHOLEST SERPL-MCNC: 156 MG/DL
HDLC SERPL-MCNC: 52 MG/DL
LDLC SERPL CALC-MCNC: 81 MG/DL
NONHDLC SERPL-MCNC: 104 MG/DL
TRIGL SERPL-MCNC: 116 MG/DL

## 2017-11-15 PROCEDURE — 36415 COLL VENOUS BLD VENIPUNCTURE: CPT | Performed by: INTERNAL MEDICINE

## 2017-11-15 PROCEDURE — 84460 ALANINE AMINO (ALT) (SGPT): CPT | Performed by: INTERNAL MEDICINE

## 2017-11-15 PROCEDURE — 80061 LIPID PANEL: CPT | Performed by: INTERNAL MEDICINE

## 2017-11-15 PROCEDURE — 99213 OFFICE O/P EST LOW 20 MIN: CPT | Performed by: INTERNAL MEDICINE
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
## 2017-11-15 NOTE — MR AVS SNAPSHOT
"              After Visit Summary   11/15/2017    Kenzie Olson    MRN: [id number]           Patient Information     Date Of Birth          1955        Visit Information        Provider Department      11/15/2017 1:45 PM Yenny Chapa MD [facility]        Today's Diagnoses     ST elevation myocardial infarction (STEMI), unspecified artery (H)    -  1    Coronary artery disease involving native coronary artery of native heart without angina pectoris        Hx of Paroxysmal supraventricular tachycardia           Follow-ups after your visit        Who to contact     If you have questions or need follow up information about today's clinic visit or your schedule please contact [facility] directly at [phone].  Normal or non-critical lab and imaging results will be communicated to you by [address], letter or phone within 4 business days after the clinic has received the results. If you do not hear from us within 7 days, please contact the clinic through [address] or phone. If you have a critical or abnormal lab result, we will notify you by phone as soon as possible.  Submit refill requests through [employer] or call your pharmacy and they will forward the refill request to us. Please allow 3 business days for your refill to be completed.          Additional Information About Your Visit        MyChart Information     [employer] lets you send messages to your doctor, view your test results, renew your prescriptions, schedule appointments and more. To sign up, go to [email or web address] . Click on \"Log in\" on the left side of the screen, which will take you to the Welcome page. Then click on \"Sign up Now\" on the right side of the page.     You will be asked to enter the access code listed below, as well as some personal information. Please follow the directions to create your username and password.     Your access code is: " [email or web address]  Expires: 2018  2:52 PM     Your access code will  in 90 days. If you need help or a new code, please call your [place] clinic or [phone].        Care EveryWhere ID     This is your Care EveryWhere ID. This could be used by other organizations to access your [place] medical records  [id number]        Your Vitals Were     Pulse Height BMI (Body Mass Index)             84 1.397 m (4' 7\") 27.5 kg/m2          Blood Pressure from Last 3 Encounters:   11/15/17 134/74   17 154/74   17 124/62    Weight from Last 3 Encounters:   11/15/17 53.7 kg (118 lb 4.8 oz)   17 51.7 kg (114 lb)   17 51.3 kg (113 lb 3.2 oz)              We Performed the Following     Follow-Up with Cardiologist        Primary Care Provider Office Phone # Fax #    Jordon Damico [phone]       [place] [address]        Equal Access to Services     [facility]: Hadii aad ku hadasho Soomaali, waaxda luqadaha, qaybta kaalmada adeegyada, sandie logan . So [facility] [phone].    ATENCIÓN: Si habla español, tiene a johnson disposición servicios gratuitos de asistencia lingüística. Llame al [phone].    We comply with applicable federal civil rights laws and [place] laws. We do not discriminate on the basis of race, color, national origin, age, disability, sex, sexual orientation, or gender identity.            Thank you!     Thank you for choosing [facility]  for your care. Our goal is always to provide you with excellent care. Hearing back from our patients is one way we can continue to improve our services. Please take a few minutes to complete the written survey that you may receive in the mail after your visit with us. Thank you!             Your Updated Medication List - Protect others around you: Learn how to safely use, store and throw away your medicines at " www.disposemymeds.org.          This list is accurate as of: 11/15/17  2:52 PM.  Always use your most recent med list.                   Brand Name Dispense Instructions for use Diagnosis    AMLODIPINE BESYLATE PO      Take 5 mg by mouth every evening        aspirin 81 MG chewable tablet     30 tablet    Take 1 tablet (81 mg) by mouth daily        calcium carbonate 500 MG chewable tablet    TUMS     Take 2 chew tab by mouth 2 times daily        cycloSPORINE modified 25 MG capsule      Take 2 capsules (50 mg) by mouth 2 times daily    History of renal transplant       folic acid 1 MG tablet    FOLVITE    30 tablet    Take 1 tablet (1 mg) by mouth daily    Anemia in chronic kidney disease (CKD)       metoprolol 50 MG 24 hr tablet    TOPROL-XL    90 tablet    Take 1 tablet (50 mg) by mouth daily    CAD (coronary artery disease)       mycophenolate 500 MG tablet    GENERIC EQUIVALENT     Take 250 mg by mouth 2 times daily        omeprazole 40 MG capsule    priLOSEC     Take by mouth 2 times daily        SODIUM BICARBONATE PO      Take 650 mg by mouth 3 times daily        sulfamethoxazole-trimethoprim 400-80 MG per tablet    BACTRIM/SEPTRA     Take 1 tablet by mouth daily        VITAMIN D3 PO      Take 1,000 Units by mouth 2 times daily

## 2017-11-16 ENCOUNTER — DOCUMENTATION ONLY (OUTPATIENT)
Dept: CARDIOLOGY | Facility: CLINIC | Age: 62
End: 2017-11-16

## 2017-11-20 ENCOUNTER — DOCUMENTATION ONLY (OUTPATIENT)
Dept: CARDIOLOGY | Facility: CLINIC | Age: 62
End: 2017-11-20

## 2017-11-20 NOTE — PROGRESS NOTES
Records received from [facility]. Copy sent to scan.

## 2018-01-01 NOTE — PROGRESS NOTES
HISTORY OF PRESENT ILLNESS:  Ms. Kenzie Olson was seen in followup at [facility] in [place].  At 61 years of age, she has end-stage renal disease and is followed for her history of coronary artery disease.      She denies any chest, neck, arm or back discomfort.  She further denies any palpitations, having undergone an SVT ablation in the past.      In 2013, she experienced a non-ST elevation myocardial infarct in the hospital after undergoing pelvic surgery.  She underwent coronary angiography and intervention on the distal right coronary artery with stent placement.  At the time of that angiogram, the left main coronary artery was free of disease.  There was a mid LAD lesion of 60%-70% and the circumflex coronary artery disease, had no obstructive disease.      She had previously undergone renal transplantation, but developed progressive renal failure and has reinitiated on hemodialysis.  She is on dialysis 3 times weekly.  She expresses a sense of fatigue but no shortness of breath or anginal symptoms as noted above.        Her last stress nuclear study was performed during 06/2015 as a preoperative evaluation for sinus surgery.  There was no evidence of ischemia and there was a normal ejection fraction estimated.      She notes that she is following with renal transplant at [facility].  She has had difficulties with anemia and now her white blood cell count is low.  It has been recommended that she see Hematology, but an appointment has not been arranged.      She is on aspirin and beta blockade, but she is not on statin therapy.  She is not on an ARB agent or an ACE inhibitor and I suspect that predates her initiation on hemodialysis.      PHYSICAL EXAMINATION:   GENERAL:  This is a woman in no apparent distress.  She appears fit and healthy.   VITAL SIGNS:  Blood pressure was 124/62 mmHg, heart rate 92 beats per minute and regular, respiratory rate 14-18 per minute.   CHEST:  Clear to auscultation.   CARDIAC:  On  CC3F cardiac auscultation, there was an S1 and S2 without extra sounds or murmur.  The rhythm was regular.      ASSESSMENT:  Ms. Olson is overall doing well.  She remains asymptomatic and on risk factor intervention.  Her risk factor intervention could probably be optimized with a statin drug added to her medical regimen,  particularly given the fact that she has renal disease and is on dialysis.  Eventually, I would anticipate that an ARB agent or ACE inhibitor could be reinitiated.      I have arranged for followup with Ms. Olson on an annual basis unless she has earlier symptoms, at which time I would want to see her back sooner.      With regard to hematology, we suggested [facility], which I believe was the recommendation.  We gave her the number and recommended making an appointment for further evaluation.         CHELLY COLÓN MD, [facility]             D: 2017 08:39   T: 2017 10:08   MT: LESLY      Name:     NAKUL OLSON   MRN:      [id number]        Account:      [id number]   :      1955           Service Date: 2017      Document: [id number]

## 2023-06-12 ENCOUNTER — TRANSFERRED RECORDS (OUTPATIENT)
Dept: PULMONOLOGY | Facility: OTHER | Age: 68
End: 2023-06-12

## 2023-06-27 DIAGNOSIS — G47.33 OSA (OBSTRUCTIVE SLEEP APNEA): Primary | ICD-10-CM

## 2023-07-18 ENCOUNTER — DOCUMENTATION ONLY (OUTPATIENT)
Dept: SLEEP MEDICINE | Facility: HOSPITAL | Age: 68
End: 2023-07-18

## 2023-07-18 NOTE — PROGRESS NOTES
SLEEP HISTORY QUESTIONNAIRE    Please describe the main reason for your sleep appointment? Always fatigued, wake up often, sometimes gasping for breath    How long has this been a problem? 5 years    Have you been diagnosed with a sleep problem in the past? NO    If so, what?     What treatment was recommended?     Have you had a sleep study in the past? NO    If yes, where and when?     Sleep Habits:   Do you read in bed? Yes  Do you eat in bed? Yes  Do you watch TV in bed? Yes  Do you work in bed? No  Do you use a phone or computer in bed? Yes    Is you sleep disturbed by:   Bed partner: No  Children: No  Noise: Yes   Pets: No  Other:       On two or more nights per week, do you drink alcohol to help you fall asleep?NO    On two or more nights per week, do you take melatonin to help you fall asleep? NO    On two or more nights per week, do you take over the counter medicine to fall asleep?  NO    Do you take drinks with caffeine (coffee, tea, soda, energy drinks)? YES    Do you have 3 or more caffeine drinks in a day? NO    Do you have caffeine drinks within 6 hours of bedtime? NO    Do you smoke or use tobacco? NO    Do you exercise? NO    Sleep Routine:   Using a 24 Hour Clock    What time do you usually get into bed on workdays? retired    Weekend/non work days?     What time do you get out of bed on workdays?       Weekend/non work days?    Do you work the evening or night shift or do your shifts rotate? DOES NOT APPLY    How long does it usually take to fall to sleep? 2 hours    How many times do you wake during the night? 2    How much time do you feel that you are awake during the entire night? 4 hours    How long does it take for you to fall back to sleep after you wake up? 2 hours    Why do you think you wake up? Bathroom, sweating    What do you do when you wake up?     How much sleep do you think you get on work nights?     How much sleep do you think you get on weekends/non work days? 6-8    How much  sleep do you think you need to feel your best? 8    How many days during a week do you take a nap on average? 2    What is the average length of your naps? 1 hour    Do you feel better after taking a nap? YES    If you could chose the best sleep schedule for you, what time would you go to bed? 110pm  What time would you get up? 9am    Do you read in bed? YES    Do you eat in bed? YES    Do you watch TV in bed? YES    Do you do work in bed? NO    Do you use a computer or phone in bed? YES    Sleep Disruptions?   Leg movements:  Do you ever have restless, crawling, aching or other unusual feelings in your legs? YES    Do you ever wake yourself by kicking your legs during the night? NO    Are the sheets and blankets messed up or tossed about when you get up? NO    Night-time behaviors:   Do you have nightmares or night terrors? YES   How often? 1/month    Have you had times when you were sleep walking? NO    Have you been seen doing anything unusual while you sleep at nights? NO  What?   How often?     Have you ever hurt yourself or someone else while you were sleeping? NO  Please describe:     Do you clench or grind your teeth during the night? no    Sleep Apnea (pauses in breathing during sleep):  Do you wake with a headache in the morning? YES  How often? Most days    Does your bed partner, family or friends ever say that you snore? YES  How many nights per week do you snore?   Can snoring be heard outside the bedroom?     Do you ever wake yourself up from snoring, gasping or choking? YES    Have you ever been told that you stop breathing or have pauses in your breathing? YES    Do you wake in the morning with a dry throat or mouth? YES    Do you have trouble breathing through your nose? NO    Do you have problems with heartburn, reflux or a hiatal hernia? YES    Which positions do you usually sleep in? (stomach, back, sides, all) back, sides    Do you use oxygen or any other medical equipment when you sleep?  NO    Do members of your family (related by blood) snore? YES    Have any members of your family been diagnosed with with sleep apnea? YES    Do other members of your family have restless leg? NO    Do other members of your family have sleep walking? NO    Have you ever had an accident, or near accident due to sleepiness while driving? NO    Does your sleepiness affect your work on the job or at school? DOES NOT APPLY    Do you ever fall asleep by accident while doing a task? NO    Have you had sudden muscle weakness when laughing, angry or surprised? NO    Have you ever been unable to move your body when falling asleep or waking up? YES    Do you ever have trouble  your dreams from real life events? NO  Please describe:     Physical Health: (including illness and injury): During the past 30 days, on how many days was your physical health not good? 30/30 days     Mental Health: (including stress, depression, and problems with emotions): During the last 30 days, how may days was your mental health not good? 0/30 days.     During the past 30 days, on how many days did poor physical or mental health keep you from doing your usual activities? This might be self-care, work, or play? 20/30 days.     Social History:   Marital status: single    Who lives in your home with you? No one    Mother (alive or dead)? dead If has , from what? stroke  Father (alive or dead)? dead If has , from what? stroke    Siblings: YES  Have any ? YES  If so, from what? cancer    Currently working? NO  If yes, work:   Former jobs:       Sleepiness Scale:   Sitting and reading 2   Watching TV 2   Sitting in a public place 1   Riding in a car 3   Lying down to rest in the afternoon 2   Sitting and talking to someone 1   Sitting quietly after a lunch without alcohol 2   In a car, stopping for a few minutes in traffic 0       Surgical History:   Past Surgical History:   Procedure Laterality Date     BONE  MARROW BIOPSY, BONE SPECIMEN, NEEDLE/TROCAR N/A 4/26/2017    Procedure: BIOPSY BONE MARROW;  UNILATERAL BONE MARROW BIOPSY/ASPIRATION ;  Surgeon: Chica Mathis MD;  Location:  GI     bow leg correction       COLONOSCOPY N/A 12/1/2016    Procedure: COLONOSCOPY;  Surgeon: Jordon Alexis MD;  Location:  GI     ESOPHAGOSCOPY, GASTROSCOPY, DUODENOSCOPY (EGD), COMBINED N/A 12/1/2016    Procedure: COMBINED ESOPHAGOSCOPY, GASTROSCOPY, DUODENOSCOPY (EGD), BIOPSY SINGLE OR MULTIPLE;  Surgeon: Jordon Alexis MD;  Location:  GI     H ABLATION AV NODE  7-11-13    SVT ablation      HEART CATH, ANGIOPLASTY  9-20-13    severe right coronary artery stenosis of 95% in the distal vessel.  [facility]      LAPAROTOMY EXPLORATORY  9/6/2013    Procedure: LAPAROTOMY EXPLORATORY;  Bilateral salpingo-oophorectomy, anastomsis of renal vein by Dr. Abbasi;  Surgeon: Tommy Foote MD;  Location:  OR     LUMPECTOMY BREAST       OPTICAL TRACKING SYSTEM ENDOSCOPIC SINUS SURGERY N/A 7/22/2015    Procedure: OPTICAL TRACKING SYSTEM ENDOSCOPIC SINUS SURGERY;  Surgeon: María Diamond MD;  Location: [facility] OR     ORTHOPEDIC SURGERY      left shoulder surgery,  torn meniscus left knee     PARATHYROIDECTOMY       PARATHYROIDECTOMY       SALPINGO-OOPHORECTOMY BILATERAL  9/6/2013    Procedure: SALPINGO-OOPHORECTOMY BILATERAL;;  Surgeon: Tommy Foote MD;  Location:  OR     TRANSPLANT KIDNEY AUTOLOGOUS         Medical Conditions:   Past Medical History:   Diagnosis Date     Arthritis      Breast cancer (H)     lumpectomy and radiation right side  2006     Chronic kidney disease     end stage     Coronary artery disease     cath 9/20/13: [facility] to RCA     Coughing      Difficulty in walking(719.7)      Gastro-oesophageal reflux disease      Hypertension      Myocardial infarction 2013    NSTEMI     Paroxysmal supraventricular tachycardia (H)     7/2013 Ablation of AV node for reentrant tach     PONV (postoperative nausea and vomiting)      Renal  disease     hx of kidney transplant 1996, Dr. Stevenson     Stented coronary artery      Vitamin D deficient rickets      Walking troubles        Medications:   Current Outpatient Medications   Medication Sig     AMLODIPINE BESYLATE PO Take 5 mg by mouth every evening     aspirin 81 MG chewable tablet Take 1 tablet (81 mg) by mouth daily     calcium carbonate (TUMS) 500 MG chewable tablet Take 2 chew tab by mouth 2 times daily     Cholecalciferol (VITAMIN D3 PO) Take 1,000 Units by mouth 2 times daily      cycloSPORINE modified (NEORAL) 25 MG capsule Take 2 capsules (50 mg) by mouth 2 times daily     folic acid (FOLVITE) 1 MG tablet Take 1 tablet (1 mg) by mouth daily     metoprolol (TOPROL-XL) 50 MG 24 hr tablet Take 1 tablet (50 mg) by mouth daily     mycophenolate (CELLCEPT-GENERIC EQUIVALENT) 500 MG tablet Take 250 mg by mouth 2 times daily      omeprazole (PRILOSEC) 40 MG capsule Take by mouth 2 times daily     SODIUM BICARBONATE PO Take 650 mg by mouth 3 times daily      sulfamethoxazole-trimethoprim (BACTRIM,SEPTRA) 400-80 MG per tablet Take 1 tablet by mouth daily      No current facility-administered medications for this visit.     Facility-Administered Medications Ordered in Other Visits   Medication     sodium chloride (PF) 0.9% PF flush 10 mL     sodium chloride (PF) 0.9% PF flush 5-10 mL     sodium chloride bacteriostatic 0.9 % flush 0-1 mL       Are you currently having any of the following symptoms?   General:   Obvious weight gain or loss NO  Fever, chills or sweats YES  Drug allergies: morphine, lisinopril,    Eyes:   Changes in vision NO  Blind spots NO  Double vision NO  Other     Ear, Nose and Throat:   Ear pain NO  Sore throat NO  Sinus pain YES  Post-nasal drip YES  Runny nose NO  Bloody nose NO    Heart:   Rapid or irregular heart beat NO  Chest pain or pressure NO  Out of breath when lying down YES  Swelling in feet or legs NO  High blood pressure NO  Heart disease YES    Nervous system    Headaches YES  Weakness in arms or legs YES  Numbness in arms of legs NO  Other:     Skin  Rashes YES  New moles or skin changes YES  Other     Lungs  Shortness of breath at rest YES  Shortness of breath with activity YES  Dry cough YES  Coughing up mucous or phlegm NO  Coughing up blood NO  Wheezing when breathing NO    Lymph System  Swollen lymph nodes NO  New lumps or bumps NO  Changes in breasts or discharge NO    Digestive System   Nausea or vomiting NO  Loose or watery stools YES  Hard, dry stools (constipation) NO  Fat or grease in stools NO  Blood in stools NO  Stools are black or bloody NO  Abdominal (belly) pain YES    Urinary Tract   Pain when you urinate (pee) NO  Blood in your urine NO  Urinate (pee) more than normal NO  Irregular periods DOES NOT APPLY    Muscles and bones   Muscle pain YES  Joint or bone pain YES  Swollen joints NO  Other     Glands  Increased thirst or urination NO  Diabetes NO  Morning glucose:   Afternoon glucose:     Mental Health  Depression NO  Anxiety NO  Other mental health issues:

## 2023-07-18 NOTE — PROGRESS NOTES
STOP DEMARCUS       Name: Kenzie Olson MRN# [id number]   Age: 67 year old YOB: 1955     Stop Bang questionnaire completed with a score of >3 to allow for HST     Have you been told you snore loudly (louder than talking or loud enough to be heard through doors)? NO    Do you often feel tired, fatigued, or sleepy during the daytime? YES    Has anyone observed you stop breathing during your sleep? YES    Do you have or are you being treated for high blood pressure? NO    Is your BMI greater than 35? NO    Is your neck size circumference 16 inches or greater? YES    Are you over 50 years old? YES    Stop Bang Score (# of yes): 4

## 2023-07-21 ENCOUNTER — TELEPHONE (OUTPATIENT)
Dept: PULMONOLOGY | Facility: OTHER | Age: 68
End: 2023-07-21

## (undated) DEVICE — PEN MARKING SKIN

## (undated) DEVICE — SPECIMEN CONTAINER 60MLW/10% FORMALIN 59601

## (undated) RX ORDER — SCOLOPAMINE TRANSDERMAL SYSTEM 1 MG/1
PATCH, EXTENDED RELEASE TRANSDERMAL
Status: DISPENSED
Start: 2017-04-26